# Patient Record
Sex: FEMALE | Race: WHITE | Employment: FULL TIME | URBAN - METROPOLITAN AREA
[De-identification: names, ages, dates, MRNs, and addresses within clinical notes are randomized per-mention and may not be internally consistent; named-entity substitution may affect disease eponyms.]

---

## 2021-03-22 ENCOUNTER — TELEPHONE (OUTPATIENT)
Dept: FAMILY MEDICINE | Facility: CLINIC | Age: 61
End: 2021-03-22

## 2021-03-22 ENCOUNTER — VIRTUAL VISIT (OUTPATIENT)
Dept: URGENT CARE | Facility: CLINIC | Age: 61
End: 2021-03-22
Payer: COMMERCIAL

## 2021-03-22 DIAGNOSIS — R05.9 COUGH: Primary | ICD-10-CM

## 2021-03-22 PROCEDURE — 99207 ZZC NO CHARGE LOS: CPT | Mod: TEL | Performed by: NURSE PRACTITIONER

## 2021-03-22 NOTE — TELEPHONE ENCOUNTER
Provider:  Please call the patient back ([phone] ) to complete the visit.  Thank you. Elvia Mcpherson R.N.    Patient states that she was on a call with Brianda Fairchild CNP and the call dropped.  I tried to reach out to Brianda via Teams and it would not send the message. I will route this message to her to call the patient back.  Thank you. Elvia Mcpherson R.N.

## 2021-03-22 NOTE — PROGRESS NOTES
SUBJECTIVE:   Magdy Garcia is a 60 year old female presenting with a chief complaint of cough.   Onset of symptoms was 2 month(s) ago.  Course of illness is waxing and waning.    Has done teladoc and given antibiotic and 2 rounds of prednisone  Feels like lungs are tight using inhaler still 4-6 times a day. Night is worse.    States has no asthma was given for this illness.   Smokes    Past Medical History:   Diagnosis Date     History of abnormal cervical Pap smear     Recent paps have been normal     Menopausal symptoms      Tobacco use      Current Outpatient Medications   Medication Sig Dispense Refill     ascorbic acid (VITAMIN C) 500 MG tablet Take 500 mg by mouth daily.       Calcium Carbonate (CALCIUM 600 PO) Take  by mouth daily.       Multiple Vitamin (MULTIVITAMIN OR) Take  by mouth daily.       VITAMIN D PO Take  by mouth daily.       Vitamin E Complex (VITAMIN E-1000) 1000 UNIT CAPS Take  by mouth daily.       Social History     Tobacco Use     Smoking status: Current Every Day Smoker     Packs/day: 0.50     Years: 30.00     Pack years: 15.00     Types: Cigarettes     Smokeless tobacco: Never Used   Substance Use Topics     Alcohol use: Yes       ROS:  Review of systems negative except as stated above.    OBJECTIVE:  GENERAL APPEARANCE: alert and no distress  RESP: lungs clear  CV: regular rates and rhythm  SKIN: no suspicious lesions or rashes    ASSESSMENT:  (R05) Cough  (primary encounter diagnosis)      PLAN:  Given duration and failed antibiotics and prednisone, still having sob tightness she should be seen  Will schedule an appointment    Telephone time spent 11 minutes      JUAN A Gilbert CNP